# Patient Record
(demographics unavailable — no encounter records)

---

## 2025-01-08 NOTE — PHYSICAL EXAM
[Bra Size: ___] : Bra Size: [unfilled] [Normocephalic] : normocephalic [Atraumatic] : atraumatic [Supple] : supple [No Supraclavicular Adenopathy] : no supraclavicular adenopathy [Examined in the supine and seated position] : examined in the supine and seated position [Grade 3] : Ptosis Grade 3 [No dominant masses] : no dominant masses in right breast  [No dominant masses] : no dominant masses left breast [No Nipple Retraction] : no left nipple retraction [No Nipple Discharge] : no left nipple discharge [No Axillary Lymphadenopathy] : no left axillary lymphadenopathy [No Edema] : no edema [No Rashes] : no rashes [No Ulceration] : no ulceration [de-identified] : Bilat pendulous large breasts, FA on the left is subtly palpable, 2 cm in 2:00 10N. No other masses noted. HUNTER.

## 2025-01-08 NOTE — DATA REVIEWED
[FreeTextEntry1] : 01/25/2022 bilat dMMG and bilat US complete: TC 12.8%. Scattered FG density. No suspicious mass, microcalcifications, or other sign of malignancy is identified. Numerous scattered punctate calcifications in the left breast is stable. Several scattered punctate calcifications in the right breast are stable. There is a 1.8 x 1.7 cm lobulated mass left breast at 2:00 o'clock posteriorly. Bilat US: RIGHT: Mixed fatty and FG. There is a new 10 x 6 mm hypoechoic nodule at 12:00 o'clock, 7 cm FN which is probably benign. Previously seen nodule at 12:00 o'clock, 1 cm FN is no longer visualized. There is a new 9 x 3 x 6 mm hypoechoic nodule at 12:00 o'clock, 3 cm FN which is probably benign. There is a new 8 x 6 x 10 mm hypoechoic nodule at 10:00 o'clock, 5 cm FN which is probably benign. There is a stable 4 x 4 x 6 mm hypoechoic nodule at 9:00 o'clock, 3 cm FN. Ra ductal dilatation is noted. No right axillary lymph nodes are identified. LEFT: Mixed fatty and FG.  There is a suspicious lobulated solid mass which is taller than wide located at 2:00 o'clock, 10 cm FN measuring 1.1 x 1.4 x 1.9 cm in size. US-guided biopsy is recommended for further evaluation. Normal LN in the left axilla measures 1.4 x 0.5 cm in size with mild RA ductal dilatation is noted. IMPRESSION: Suspicious 1.9 cm mass in the left breast at 2:00 o'clock, 10 cm FN. US-guided biopsy is recommended for further evaluation. Probably benign right breast nodules as described above. Recommend 6 month follow-up US. RECOMMENDATION: US biopsy. BR4B.  2/3/2022 [facility] left US core bx: 2:00 cm mass breast 2:00:  -Fibroadenoma. -Benign and Concordant.   5/7/24 NFR bilat sMMG: TC 13%. Scattered FG density. No susp mass, microcalcs, or other sign of malignancy is identified. Stable bilateral nodularity with a tissue marker noted UOQ left breast. NORAH-0. IMPRESSION: No mmg evidence of malignancy. REC: mmg in 1 year. BR2.

## 2025-01-08 NOTE — ASSESSMENT
[FreeTextEntry1] : Referred by Iglesia Brower MD PCP Iglesia Brower MD   Patient is a 44 year old female here today for consultation of left breast nodule and left breast pain vs heart in Nov 2024. Currently getting w/u with cardiology and so far all negative. Echo to be done.  Pt had a left breast biopsy for 2cm nodule in 2/2022 that was benign, fibroadenoma.  Hx of bilat breast cysts seen on mmg dated back from 2012 per records.  Pt denies any breast lesions, discharge or masses.  01/25/2022 bilat dMMG and bilat US complete: TC 12.8%. Scattered FG density. No suspicious mass, microcalcifications, or other sign of malignancy is identified. Numerous scattered punctate calcifications in the left breast is stable. Several scattered punctate calcifications in the right breast are stable. There is a 1.8 x 1.7 cm lobulated mass left breast at 2:00 o'clock posteriorly. Bilat US: RIGHT: Mixed fatty and FG. There is a new 10 x 6 mm hypoechoic nodule at 12:00 o'clock, 7 cm FN which is probably benign. Previously seen nodule at 12:00 o'clock, 1 cm FN is no longer visualized. There is a new 9 x 3 x 6 mm hypoechoic nodule at 12:00 o'clock, 3 cm FN which is probably benign. There is a new 8 x 6 x 10 mm hypoechoic nodule at 10:00 o'clock, 5 cm FN which is probably benign. There is a stable 4 x 4 x 6 mm hypoechoic nodule at 9:00 o'clock, 3 cm FN. Ra ductal dilatation is noted. No right axillary lymph nodes are identified. LEFT: Mixed fatty and FG.  There is a suspicious lobulated solid mass which is taller than wide located at 2:00 o'clock, 10 cm FN measuring 1.1 x 1.4 x 1.9 cm in size. US-guided biopsy is recommended for further evaluation. Normal LN in the left axilla measures 1.4 x 0.5 cm in size with mild RA ductal dilatation is noted. IMPRESSION: Suspicious 1.9 cm mass in the left breast at 2:00 o'clock, 10 cm FN. US-guided biopsy is recommended for further evaluation. Probably benign right breast nodules as described above. Recommend 6 month follow-up US. RECOMMENDATION: US biopsy. BR4B.  2/3/2022 [facility] left US core bx: 2:00 cm mass breast 2:00:  -Fibroadenoma. -Benign and Concordant.   5/7/24 NFR bilat sMMG: TC 13%. Scattered FG density. No susp mass, microcalcs, or other sign of malignancy is identified. Stable bilateral nodularity with a tissue marker noted UOQ left breast. NORAH-0. IMPRESSION: No mmg evidence of malignancy. REC: mmg in 1 year. BR2.    Fhx: None for cancer. CBE: Bilat pendulous large breasts, FA on the left is subtly palpable, 2 cm in 2:00 10N. No other masses noted.  PT with grooves on her shoulder, she would like to consider reduction.  PLAN: Bilat u/s and then f.u. Futher management of the FA pending results.  Further evaluation with cardiology. Appt with Dr. Moseley about reduction.

## 2025-01-08 NOTE — DATA REVIEWED
[FreeTextEntry1] : 01/25/2022 bilat dMMG and bilat US complete: TC 12.8%. Scattered FG density. No suspicious mass, microcalcifications, or other sign of malignancy is identified. Numerous scattered punctate calcifications in the left breast is stable. Several scattered punctate calcifications in the right breast are stable. There is a 1.8 x 1.7 cm lobulated mass left breast at 2:00 o'clock posteriorly. Bilat US: RIGHT: Mixed fatty and FG. There is a new 10 x 6 mm hypoechoic nodule at 12:00 o'clock, 7 cm FN which is probably benign. Previously seen nodule at 12:00 o'clock, 1 cm FN is no longer visualized. There is a new 9 x 3 x 6 mm hypoechoic nodule at 12:00 o'clock, 3 cm FN which is probably benign. There is a new 8 x 6 x 10 mm hypoechoic nodule at 10:00 o'clock, 5 cm FN which is probably benign. There is a stable 4 x 4 x 6 mm hypoechoic nodule at 9:00 o'clock, 3 cm FN. Ra ductal dilatation is noted. No right axillary lymph nodes are identified. LEFT: Mixed fatty and FG.  There is a suspicious lobulated solid mass which is taller than wide located at 2:00 o'clock, 10 cm FN measuring 1.1 x 1.4 x 1.9 cm in size. US-guided biopsy is recommended for further evaluation. Normal LN in the left axilla measures 1.4 x 0.5 cm in size with mild RA ductal dilatation is noted. IMPRESSION: Suspicious 1.9 cm mass in the left breast at 2:00 o'clock, 10 cm FN. US-guided biopsy is recommended for further evaluation. Probably benign right breast nodules as described above. Recommend 6 month follow-up US. RECOMMENDATION: US biopsy. BR4B.  2/3/2022 [facility] left US core bx: 2:00 cm mass breast 2:00:  -Fibroadenoma. -Benign and Concordant.   5/7/24 NFR bilat sMMG: TC 13%. Scattered FG density. No susp mass, microcalcs, or other sign of malignancy is identified. Stable bilateral nodularity with a tissue marker noted UOQ left breast. NORAH-0. IMPRESSION: No mmg evidence of malignancy. REC: mmg in 1 year. BR2.

## 2025-01-08 NOTE — PAST MEDICAL HISTORY
[Menstruating] : The patient is menstruating [Menarche Age ____] : age at menarche was [unfilled] [Approximately ___] : the LMP was approximately [unfilled] [Total Preg ___] : G[unfilled] [Live Births ___] : P[unfilled]  [Age At Live Birth ___] : Age at live birth: [unfilled] [History of Hormone Replacement Treatment] : has no history of hormone replacement treatment [FreeTextEntry7] : None [FreeTextEntry8] : Yes, for 1 month.

## 2025-01-08 NOTE — PHYSICAL EXAM
[Bra Size: ___] : Bra Size: [unfilled] [Normocephalic] : normocephalic [Atraumatic] : atraumatic [Supple] : supple [No Supraclavicular Adenopathy] : no supraclavicular adenopathy [Examined in the supine and seated position] : examined in the supine and seated position [Grade 3] : Ptosis Grade 3 [No dominant masses] : no dominant masses in right breast  [No dominant masses] : no dominant masses left breast [No Nipple Retraction] : no left nipple retraction [No Nipple Discharge] : no left nipple discharge [No Axillary Lymphadenopathy] : no left axillary lymphadenopathy [No Edema] : no edema [No Rashes] : no rashes [No Ulceration] : no ulceration [de-identified] : Bilat pendulous large breasts, FA on the left is subtly palpable, 2 cm in 2:00 10N. No other masses noted. HUNTER.

## 2025-01-08 NOTE — PHYSICAL EXAM
[Bra Size: ___] : Bra Size: [unfilled] [Normocephalic] : normocephalic [Atraumatic] : atraumatic [Supple] : supple [No Supraclavicular Adenopathy] : no supraclavicular adenopathy [Examined in the supine and seated position] : examined in the supine and seated position [Grade 3] : Ptosis Grade 3 [No dominant masses] : no dominant masses in right breast  [No dominant masses] : no dominant masses left breast [No Nipple Retraction] : no left nipple retraction [No Nipple Discharge] : no left nipple discharge [No Axillary Lymphadenopathy] : no left axillary lymphadenopathy [No Edema] : no edema [No Rashes] : no rashes [No Ulceration] : no ulceration [de-identified] : Bilat pendulous large breasts, FA on the left is subtly palpable, 2 cm in 2:00 10N. No other masses noted. HUNTER.

## 2025-01-08 NOTE — HISTORY OF PRESENT ILLNESS
[FreeTextEntry1] : Referred by Iglesia Brower MD PCP Iglesia Brower MD   Patient is a 44 year old female here today for consultation of left breast nodule and left breast pain vs heart in Nov 2024. Currently getting w/u with cardiology and so far all negative. Echo to be done.  Pt had a left breast biopsy for 2cm nodule in 2/2022 that was benign, fibroadenoma.  Hx of bilat breast cysts seen on mmg dated back from 2012 per records.  Pt denies any breast lesions, discharge or masses.  01/25/2022 bilat dMMG and bilat US complete: TC 12.8%. Scattered FG density. No suspicious mass, microcalcifications, or other sign of malignancy is identified. Numerous scattered punctate calcifications in the left breast is stable. Several scattered punctate calcifications in the right breast are stable. There is a 1.8 x 1.7 cm lobulated mass left breast at 2:00 o'clock posteriorly. Bilat US: RIGHT: Mixed fatty and FG. There is a new 10 x 6 mm hypoechoic nodule at 12:00 o'clock, 7 cm FN which is probably benign. Previously seen nodule at 12:00 o'clock, 1 cm FN is no longer visualized. There is a new 9 x 3 x 6 mm hypoechoic nodule at 12:00 o'clock, 3 cm FN which is probably benign. There is a new 8 x 6 x 10 mm hypoechoic nodule at 10:00 o'clock, 5 cm FN which is probably benign. There is a stable 4 x 4 x 6 mm hypoechoic nodule at 9:00 o'clock, 3 cm FN. Ra ductal dilatation is noted. No right axillary lymph nodes are identified. LEFT: Mixed fatty and FG.  There is a suspicious lobulated solid mass which is taller than wide located at 2:00 o'clock, 10 cm FN measuring 1.1 x 1.4 x 1.9 cm in size. US-guided biopsy is recommended for further evaluation. Normal LN in the left axilla measures 1.4 x 0.5 cm in size with mild RA ductal dilatation is noted. IMPRESSION: Suspicious 1.9 cm mass in the left breast at 2:00 o'clock, 10 cm FN. US-guided biopsy is recommended for further evaluation. Probably benign right breast nodules as described above. Recommend 6 month follow-up US. RECOMMENDATION: US biopsy. BR4B.  2/3/2022 [facility] left US core bx: 2:00 cm mass breast 2:00:  -Fibroadenoma. -Benign and Concordant.   5/7/24 NFR bilat sMMG: TC 13%. Scattered FG density. No susp mass, microcalcs, or other sign of malignancy is identified. Stable bilateral nodularity with a tissue marker noted UOQ left breast. NORAH-0. IMPRESSION: No mmg evidence of malignancy. REC: mmg in 1 year. BR2.   Fhx: None for cancer.

## 2025-01-08 NOTE — CONSULT LETTER
[Dear  ___] : Dear  [unfilled], [Consult Letter:] : I had the pleasure of evaluating your patient, [unfilled]. [Please see my note below.] : Please see my note below. [Consult Closing:] : Thank you very much for allowing me to participate in the care of this patient.  If you have any questions, please do not hesitate to contact me. [Sincerely,] : Sincerely, [DrJanet  ___] : Dr. ORO [FreeTextEntry3] : Karissa Ojeda MD

## 2025-01-08 NOTE — DATA REVIEWED
[FreeTextEntry1] : 01/25/2022 bilat dMMG and bilat US complete: TC 12.8%. Scattered FG density. No suspicious mass, microcalcifications, or other sign of malignancy is identified. Numerous scattered punctate calcifications in the left breast is stable. Several scattered punctate calcifications in the right breast are stable. There is a 1.8 x 1.7 cm lobulated mass left breast at 2:00 o'clock posteriorly. Bilat US: RIGHT: Mixed fatty and FG. There is a new 10 x 6 mm hypoechoic nodule at 12:00 o'clock, 7 cm FN which is probably benign. Previously seen nodule at 12:00 o'clock, 1 cm FN is no longer visualized. There is a new 9 x 3 x 6 mm hypoechoic nodule at 12:00 o'clock, 3 cm FN which is probably benign. There is a new 8 x 6 x 10 mm hypoechoic nodule at 10:00 o'clock, 5 cm FN which is probably benign. There is a stable 4 x 4 x 6 mm hypoechoic nodule at 9:00 o'clock, 3 cm FN. Ra ductal dilatation is noted. No right axillary lymph nodes are identified. LEFT: Mixed fatty and FG.  There is a suspicious lobulated solid mass which is taller than wide located at 2:00 o'clock, 10 cm FN measuring 1.1 x 1.4 x 1.9 cm in size. US-guided biopsy is recommended for further evaluation. Normal LN in the left axilla measures 1.4 x 0.5 cm in size with mild RA ductal dilatation is noted. IMPRESSION: Suspicious 1.9 cm mass in the left breast at 2:00 o'clock, 10 cm FN. US-guided biopsy is recommended for further evaluation. Probably benign right breast nodules as described above. Recommend 6 month follow-up US. RECOMMENDATION: US biopsy. BR4B.  2/3/2022 [facility] left US core bx: 2:00 cm mass breast 2:00:  -Fibroadenoma. -Benign and Concordant.   5/7/24 NFR bilat sMMG: TC 13%. Scattered FG density. No susp mass, microcalcs, or other sign of malignancy is identified. Stable bilateral nodularity with a tissue marker noted UOQ left breast. NORAH-0. IMPRESSION: No mmg evidence of malignancy. REC: mmg in 1 year. BR2.

## 2025-01-08 NOTE — HISTORY OF PRESENT ILLNESS
[FreeTextEntry1] : Referred by Iglesia Brower MD PCP Igleisa Brower MD   Patient is a 44 year old female here today for consultation of left breast nodule and left breast pain vs heart in Nov 2024. Currently getting w/u with cardiology and so far all negative. Echo to be done.  Pt had a left breast biopsy for 2cm nodule in 2/2022 that was benign, fibroadenoma.  Hx of bilat breast cysts seen on mmg dated back from 2012 per records.  Pt denies any breast lesions, discharge or masses.  01/25/2022 bilat dMMG and bilat US complete: TC 12.8%. Scattered FG density. No suspicious mass, microcalcifications, or other sign of malignancy is identified. Numerous scattered punctate calcifications in the left breast is stable. Several scattered punctate calcifications in the right breast are stable. There is a 1.8 x 1.7 cm lobulated mass left breast at 2:00 o'clock posteriorly. Bilat US: RIGHT: Mixed fatty and FG. There is a new 10 x 6 mm hypoechoic nodule at 12:00 o'clock, 7 cm FN which is probably benign. Previously seen nodule at 12:00 o'clock, 1 cm FN is no longer visualized. There is a new 9 x 3 x 6 mm hypoechoic nodule at 12:00 o'clock, 3 cm FN which is probably benign. There is a new 8 x 6 x 10 mm hypoechoic nodule at 10:00 o'clock, 5 cm FN which is probably benign. There is a stable 4 x 4 x 6 mm hypoechoic nodule at 9:00 o'clock, 3 cm FN. Ra ductal dilatation is noted. No right axillary lymph nodes are identified. LEFT: Mixed fatty and FG.  There is a suspicious lobulated solid mass which is taller than wide located at 2:00 o'clock, 10 cm FN measuring 1.1 x 1.4 x 1.9 cm in size. US-guided biopsy is recommended for further evaluation. Normal LN in the left axilla measures 1.4 x 0.5 cm in size with mild RA ductal dilatation is noted. IMPRESSION: Suspicious 1.9 cm mass in the left breast at 2:00 o'clock, 10 cm FN. US-guided biopsy is recommended for further evaluation. Probably benign right breast nodules as described above. Recommend 6 month follow-up US. RECOMMENDATION: US biopsy. BR4B.  2/3/2022 [facility] left US core bx: 2:00 cm mass breast 2:00:  -Fibroadenoma. -Benign and Concordant.   5/7/24 NFR bilat sMMG: TC 13%. Scattered FG density. No susp mass, microcalcs, or other sign of malignancy is identified. Stable bilateral nodularity with a tissue marker noted UOQ left breast. NORAH-0. IMPRESSION: No mmg evidence of malignancy. REC: mmg in 1 year. BR2.   Fhx: None for cancer.

## 2025-07-28 NOTE — HISTORY OF PRESENT ILLNESS
[FreeTextEntry1] : This is a 43 y/o female who presents with a history of rashes, moisture, itching and an infected cyst (boil) under the right breast that was excised, but still has pasty material coming out (Dr. Burnett?). She reports her bra size to be a 34 i and complains of right greater than left shoulder pain and occasional neck pain due to the weight of her breasts. She has to use Clotrimazole cream under her breasts, and uses a hair dryer after each shower to make sure her skin gets dry. She has a hard time sleeping, because she is a side-sleeper and finds it hard to move her breast out of the way to do this.  She had a breast US this year, and thinks she had a MMG last year.  She would like to travel to the [facility] at the end of June next year when her daughter is out of school. She is planning on staying for 6 weeks, and would like to have the surgery before then (late April-early May 2026.  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 Pharmacy confirmed [facility]

## 2025-07-28 NOTE — ASSESSMENT
[FreeTextEntry1] : Bilateral macromastia with ptosis and associated neck and shoulder pain with intertrigo and inflammed inclusion cyst. Bilateral breast reduction is planned. We reviewed the R/B/A including, but not limited to, the risks of decreased sensation or loss of sensation of the NAC, partial or complete NAC necrosis, wound healing problems requiring wound care, especially at the T-junction points of the incisions. We also reviewed the fact that I cannot promise to achieve any given bra cup size postoperatively as this depends on many factors including the style and  of the bra.   We will need to discuss the lateral bra rolls, as these will not be resolved with this procedure.   We also reviewed risks of surgery in general (bleeding or hematoma requiring a return to the operating room, wound breakdown at the T junctions and infection).    Given this patient's physical exam and complaints, I estimate that the removal of 350-400 gms of tissue per side will improve her symptoms significantly. Reduction mammaplasty is a medically necessary procedure for this patient, as it is being performed for the relief of symptomatic breast hypertrophy.   The non-surgical treatments the patient has used to try to relieve her symptoms include: -Topical antifungal agents for intertrigo. -Excision of inclusion cyst with recurrence. -A trial of nonsteroidal anti-inflammatory medications to treat neck, shoulder, back or breast pain. -Supportive bras.  CPT codes: 19730-89, 43459 (Bilateral breast reduction, right chest wall cyst excision) She is to return in March to review the surgical plan and finalize a date.

## 2025-07-28 NOTE — DATA REVIEWED
[FreeTextEntry1] : May 2024 last MMG BIRADs 2 - benign Feb 2025 last US bilat breasts BIRADs 2 - benign. Left breast with biopsy proven fibroadenoma, not significantly changed since 2022. Rec: resume annual mammography on schedule.

## 2025-07-28 NOTE — PHYSICAL EXAM
[Bra Size: _______] : Bra Size: [unfilled] [NI] : Normal [de-identified] : NL respiratory effort noted  [de-identified] : bilateral breasts with grade 3+ ptosis. There is hyperpigmentation under both breasts where there is skin-on-skin contact and the areas are very moist. Just inferior to the right medial IMF is a cyst scar about 2 cm in length with a recurrent cyst 6-7 mm in size. No palpable masses.

## 2025-07-28 NOTE — ASSESSMENT
[FreeTextEntry1] : Bilateral macromastia with ptosis and associated neck and shoulder pain with intertrigo and inflammed inclusion cyst. Bilateral breast reduction is planned. We reviewed the R/B/A including, but not limited to, the risks of decreased sensation or loss of sensation of the NAC, partial or complete NAC necrosis, wound healing problems requiring wound care, especially at the T-junction points of the incisions. We also reviewed the fact that I cannot promise to achieve any given bra cup size postoperatively as this depends on many factors including the style and  of the bra.   We will need to discuss the lateral bra rolls, as these will not be resolved with this procedure.   We also reviewed risks of surgery in general (bleeding or hematoma requiring a return to the operating room, wound breakdown at the T junctions and infection).    Given this patient's physical exam and complaints, I estimate that the removal of 350-400 gms of tissue per side will improve her symptoms significantly. Reduction mammaplasty is a medically necessary procedure for this patient, as it is being performed for the relief of symptomatic breast hypertrophy.   The non-surgical treatments the patient has used to try to relieve her symptoms include: -Topical antifungal agents for intertrigo. -Excision of inclusion cyst with recurrence. -A trial of nonsteroidal anti-inflammatory medications to treat neck, shoulder, back or breast pain. -Supportive bras.  CPT codes: 58441-65, 15927 (Bilateral breast reduction, right chest wall cyst excision) She is to return in March to review the surgical plan and finalize a date.

## 2025-07-28 NOTE — PHYSICAL EXAM
[Bra Size: _______] : Bra Size: [unfilled] [NI] : Normal [de-identified] : NL respiratory effort noted  [de-identified] : bilateral breasts with grade 3+ ptosis. There is hyperpigmentation under both breasts where there is skin-on-skin contact and the areas are very moist. Just inferior to the right medial IMF is a cyst scar about 2 cm in length with a recurrent cyst 6-7 mm in size. No palpable masses.

## 2025-07-28 NOTE — HISTORY OF PRESENT ILLNESS
[FreeTextEntry1] : This is a 45 y/o female who presents with a history of rashes, moisture, itching and an infected cyst (boil) under the right breast that was excised, but still has pasty material coming out (Dr. Burnett?). She reports her bra size to be a 34 i and complains of right greater than left shoulder pain and occasional neck pain due to the weight of her breasts. She has to use Clotrimazole cream under her breasts, and uses a hair dryer after each shower to make sure her skin gets dry. She has a hard time sleeping, because she is a side-sleeper and finds it hard to move her breast out of the way to do this.  She had a breast US this year, and thinks she had a MMG last year.  She would like to travel to the [facility] at the end of June next year when her daughter is out of school. She is planning on staying for 6 weeks, and would like to have the surgery before then (late April-early May 2026.  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